# Patient Record
Sex: FEMALE | Race: WHITE | NOT HISPANIC OR LATINO | Employment: FULL TIME | URBAN - METROPOLITAN AREA
[De-identification: names, ages, dates, MRNs, and addresses within clinical notes are randomized per-mention and may not be internally consistent; named-entity substitution may affect disease eponyms.]

---

## 2023-06-04 ENCOUNTER — TRANSFERRED RECORDS (OUTPATIENT)
Dept: HEALTH INFORMATION MANAGEMENT | Facility: CLINIC | Age: 38
End: 2023-06-04

## 2024-05-30 ENCOUNTER — TRANSCRIBE ORDERS (OUTPATIENT)
Dept: OTHER | Age: 39
End: 2024-05-30

## 2024-05-30 DIAGNOSIS — R93.0 ABNORMAL MRI OF HEAD: Primary | ICD-10-CM

## 2024-05-31 ENCOUNTER — TELEPHONE (OUTPATIENT)
Dept: NEUROLOGY | Facility: CLINIC | Age: 39
End: 2024-05-31
Payer: COMMERCIAL

## 2024-05-31 NOTE — TELEPHONE ENCOUNTER
[facility] Call Center    Phone Message    May a detailed message be left on voicemail: yes     Reason for Call: Appointment Intake    Referring Provider Name: Dr. Mayra Bland affiliated with [facility]   Diagnosis and/or Symptoms: Abnormal MRI of head -2nd opinion MS    Per patient-she also has cervical dystonia. Dr. Bland recommended that she see a neuromuscular provider and not Dr. Dow.    Please review and contact the patient to discuss scheduling options.    Action Taken: Message routed to:  [facility] ([facility] Neurology    Travel Screening: Not Applicable     Date of Service:

## 2024-06-07 NOTE — TELEPHONE ENCOUNTER
Left Voicemail (1st Attempt) for the patient to call back and schedule the following:    Appointment type: New MS  Provider: Victor M Mars or Rubén  Return date: Next available  Specialty phone number: [phone]  Additional appointment(s) needed:   Additonal Notes: second opinion on MS diagnosis

## 2024-06-07 NOTE — TELEPHONE ENCOUNTER
Spoke with Clara. She would like to be seen in the MS clinic for a second opinion on whether she has MS or not. But she also reports that her current neurologist is recommending being seen by a neuromuscular specialist for neuropathy. EMG result available in media tab.    Clinic coordinators, please schedule pt in MS clinic.    Neuromuscular team - would this pt able to be seen in neuromuscular clinic for neuropathy?    Edda Russell RN

## 2024-06-11 NOTE — TELEPHONE ENCOUNTER
Left Voicemail (2nd Attempt) for the patient to call back and schedule the following:    Appointment type: New MS  Provider: Victor M Mars or Rubén  Return date: Next available  Specialty phone number: [phone]  Additional appointment(s) needed:   Additonal Notes: second opinion of Leilani Hidalgo on 6/11/2024 at 9:47 AM

## 2024-10-24 ENCOUNTER — OFFICE VISIT (OUTPATIENT)
Dept: NEUROLOGY | Facility: CLINIC | Age: 39
End: 2024-10-24
Attending: PSYCHIATRY & NEUROLOGY
Payer: COMMERCIAL

## 2024-10-24 VITALS — HEART RATE: 75 BPM | SYSTOLIC BLOOD PRESSURE: 113 MMHG | DIASTOLIC BLOOD PRESSURE: 69 MMHG

## 2024-10-24 DIAGNOSIS — M62.838 MUSCLE SPASM: Primary | ICD-10-CM

## 2024-10-24 DIAGNOSIS — M79.2 NEUROPATHIC PAIN: ICD-10-CM

## 2024-10-24 DIAGNOSIS — R83.8 ELEVATED CSF PROTEIN: ICD-10-CM

## 2024-10-24 PROCEDURE — 99205 OFFICE O/P NEW HI 60 MIN: CPT | Performed by: PSYCHIATRY & NEUROLOGY

## 2024-10-24 RX ORDER — NORETHINDRONE 5 MG/1
1 TABLET ORAL DAILY
COMMUNITY
Start: 2024-09-17

## 2024-10-24 RX ORDER — BACLOFEN 20 MG/1
20 TABLET ORAL 4 TIMES DAILY
COMMUNITY
Start: 2024-08-15

## 2024-10-24 RX ORDER — OXYCODONE AND ACETAMINOPHEN 5; 325 MG/1; MG/1
TABLET ORAL
COMMUNITY
Start: 2022-11-07

## 2024-10-24 RX ORDER — FROVATRIPTAN SUCCINATE 2.5 MG/1
2.5 TABLET, FILM COATED ORAL
COMMUNITY
Start: 2024-10-09

## 2024-10-24 RX ORDER — GABAPENTIN 800 MG/1
800 TABLET ORAL 3 TIMES DAILY
COMMUNITY
Start: 2024-05-29

## 2024-10-24 RX ORDER — ONDANSETRON 8 MG/1
8 TABLET, ORALLY DISINTEGRATING ORAL DAILY PRN
COMMUNITY
Start: 2023-11-10

## 2024-10-24 RX ORDER — PANTOPRAZOLE SODIUM 40 MG/1
TABLET, DELAYED RELEASE ORAL
COMMUNITY
Start: 2023-04-12

## 2024-10-24 RX ORDER — RIZATRIPTAN BENZOATE 10 MG/1
TABLET ORAL
COMMUNITY
Start: 2023-09-13

## 2024-10-24 RX ORDER — HYDROXYZINE HYDROCHLORIDE 50 MG/1
TABLET, FILM COATED ORAL
COMMUNITY
Start: 2023-01-11

## 2024-10-24 RX ORDER — FAMOTIDINE 40 MG/1
40 TABLET, FILM COATED ORAL DAILY
COMMUNITY
Start: 2023-11-10

## 2024-10-24 RX ORDER — SERTRALINE HYDROCHLORIDE 100 MG/1
2 TABLET, FILM COATED ORAL DAILY
COMMUNITY
Start: 2023-03-30

## 2024-10-24 RX ORDER — TOPIRAMATE 200 MG/1
100 TABLET, FILM COATED ORAL 2 TIMES DAILY
COMMUNITY
Start: 2023-08-16

## 2024-10-24 RX ORDER — BUSPIRONE HYDROCHLORIDE 30 MG/1
30 TABLET ORAL 2 TIMES DAILY
COMMUNITY
Start: 2023-01-11

## 2024-10-24 ASSESSMENT — PATIENT HEALTH QUESTIONNAIRE - PHQ9: SUM OF ALL RESPONSES TO PHQ QUESTIONS 1-9: 14

## 2024-10-24 NOTE — NURSING NOTE
Chief Complaint   Patient presents with    Results     Follow up Abnormal MRI brain        CLARI Ayala on 10/24/2024 at 10:14 AM

## 2024-10-24 NOTE — LETTER
10/24/2024      Clara Greenberg  [address] [address]      Dear Colleague,    Thank you for referring your patient, Clara Greenberg, to the [facility] NEUROLOGY [facility]. Please see a copy of my visit note below.    Date of Service: 10/24/2024    [facility] Neurology   MS Clinic Evaluation    Subjective: 39-year-old woman with a history of migraines and cervical dystonia who presents for evaluation of possible multiple sclerosis.    In 2021 she suffered rather sudden onset of electrical impulses into the fingertips.  This affected both hands in the middle 3 fingers.  It has decreased in intensity over the past 6 months.  However it is not clear to me if the improvement in symptoms corresponded with an increase in her gabapentin dosage.    She states that the electrical impulses are not provoked by neck movement.  They can happen when she is laying still.  She feels something start in her head, she describes this as a twitch like sensation, before the electrical impulses into the fingertips.    She struggles with muscle spasms.  These affect the shoulders, upper back and calves.  She is receiving Botox for cervical dystonia.  She feels weak.    She has had 2 back surgeries in the remote past.  This included an L4-5 discectomy.  Over this past weekend she struggled with severe right calf tightness that made it difficult for her to walk.    She struggles with constant spasms.  These have been present for years.  She is currently taking baclofen 20 mg 4 times per day and tizanidine 8 mg at bedtime to take the edge off.    She had 1 fall 6 weeks ago when she passed out.    She has also reported tingling in her lips and left arm.  Her legs can feel heavy.    She does have a history of seizure in June 2021.    She was diagnosed with multiple sclerosis by another provider.  She was placed on Vumerity for a couple years, then briumvi, but after receiving briumvi she felt that her symptoms got  "worse.    Allergies   Allergen Reactions     Contrast Dye      \"Just to CT and not MRI\" - Per pt     Sulfa Antibiotics        Current Outpatient Medications   Medication Sig Dispense Refill     baclofen (LIORESAL) 20 MG tablet Take 20 mg by mouth 4 times daily.       busPIRone HCl (BUSPAR) 30 MG tablet Take 30 mg by mouth 2 times daily.       cholecalciferol 50 MCG (2000 UT) tablet Take 2,000 Units by mouth daily.       famotidine (PEPCID) 40 MG tablet Take 40 mg by mouth daily.       frovatriptan (FROVA) 2.5 MG tablet Take 2.5 mg by mouth at onset of headache for migraine.       gabapentin (NEURONTIN) 800 MG tablet Take 800 mg by mouth 3 times daily.       hydrOXYzine HCl (ATARAX) 50 MG tablet TAKE ONE TO TWO TABLETS BY MOUTH THREE TIMES A DAY AT BEDTIME       norethindrone (AYGESTIN) 5 MG tablet Take 1 tablet by mouth daily.       ondansetron (ZOFRAN ODT) 8 MG ODT tab Take 8 mg by mouth daily as needed for nausea.       oxyCODONE-acetaminophen (PERCOCET) 5-325 MG tablet TAKE ONE TABLET BY MOUTH EVERY 6 HOURS AS NEEDED SEVERE PAIN MUST LAST 30 DAYS *CAUTION: OPIOID. RISK OF OVERDOSE AND ADDICTION.       pantoprazole (PROTONIX) 40 MG EC tablet TAKE ONE TABLET BY MOUTH TWICE A DAY 30-60 MINUTES BEFORE MEALS       rizatriptan (MAXALT) 10 MG tablet TAKE 1 TABLET AT ONSET OF MIGRAINE. MAY REPEAT IN 2 HOURS IF NEEDED UP TO 30 MG/ 24 HOURS AND MAX OF 5 DAYS/MONTH       sertraline (ZOLOFT) 100 MG tablet Take 2 tablets by mouth daily.       tiZANidine (ZANAFLEX) 4 MG tablet Take 2 tablets by mouth at bedtime.       topiramate (TOPAMAX) 200 MG tablet Take 100 mg by mouth 2 times daily.       ubrogepant (UBRELVY) 100 MG tablet TAKE ONE TABLET BY MOUTH AT ONSET OF HEADACHE - MAY REPEAT IN 2 HOURS AS NEEDED       No current facility-administered medications for this visit.        Past medical, surgical, social and family history was personally reviewed. Pertinent details noted above.     Physical Examination:   /69 (BP " Location: Right arm, Patient Position: Sitting, Cuff Size: Adult Regular)   Pulse 75     General: no acute distress  Cranial nerves:   VFFC  PERRL w/no RAPD  EOM full w/no CHRISTINA   Face symmetric  Hearing intact  No dysarthria   Motor:   Tone is normal   Bulk is normal     R L  Deltoid  5 5  Biceps  5 5  Triceps 5 5  Wrist ext 5 5  Finger ext 5 5  Finger abd 5 5    Hip flexion 5 5  Knee flexion 5 5  Knee ext 5 5  Ankle d/f 5 5    Reflexes: 2+ and symmetric throughout, babinski absent bilaterally  Sensory: vibration is severely reduced in the toes, mildly reduced in the ankles, JPS normal in the toes   Romberg is absent  Coordination: no ataxia or dysmetria  Gait: normal base and stride, tandem gait is intact w effort    Tests/Imaging:   CSF traumatic tap w 130k rbc, elevated protein and igg index     Serum encephalopathy panel (including gad65) and lupus screening were negative     EMG 5/2024 -    Impression: This is abnormal study due to the findings of bilateral  chronic left L4-5 and right L5-S1 lumbar radiculopathies. However, given  reduced bilaterally sural amplitudes this is indicative of superimposed  mild early sensory polyneuropathy if clinically suspected.  There were no electrophysiologic findings of active bilateral lumbo-sacral  radiculopathies or plexopathies.         MRI Brain  6/2023 - area of blush enhancement in left corona radiata (chronic and stable between multiple studies), ?faint spots in corpus callosum    MRI Cervical spine   6/2023 - mild deg changes, no spinal cord lesions    MRI Thoracic spine   6/2023 - no lesions noted     Assessment: 39-year-old woman who has predominantly struggled with muscle spasms, but has also experienced symptoms of neuropathic pain.  CSF was potentially positive for an elevated IgG index and elevated protein level, though this is with the caveat that this study was clearly traumatic.    If the IgG index is truly elevated this would raise suspicion for an immune  mediated disorder.  Stiff person syndrome can produce muscle cramps and is expected to cause CSF inflammation.  Alternatively, her MRI brain was remarkable for some faint white spots in the corpus callosum.  This may be suggestive of Susac syndrome.  Additional testing is required.    Appearance of MRI is not suggestive of multiple sclerosis, nor are her symptoms.  She is not advised to trial additional disease modifying therapies for multiple sclerosis.  We discussed how the area of enhancement is most consistent with a venous anomaly.    Plan:   -Repeat CSF testing  - Follow-up to discuss results, consider MRI brain 7 Maddie    Note was completed with the assistance of Dragon Fluency software which can often result in accidental word substitutions.     A total of 60 minutes on the date of service were spent in the care of this patient.   Lara Dow MD on 10/24/2024 at 10:17 AM        Again, thank you for allowing me to participate in the care of your patient.        Sincerely,        Lara Dow MD

## 2024-10-24 NOTE — PATIENT INSTRUCTIONS
When your spinal fluid was tested there was an elevation of an immune protein   However, the tap was traumatic, so this elevation might not be real     I recommend repeating the spinal tap to determine if the elevation in protein is real, because this would impact my recommendations for you     Follow up to discuss results

## 2024-10-24 NOTE — PROGRESS NOTES
"Date of Service: 10/24/2024    [facility] Neurology   MS Clinic Evaluation    Subjective: 39-year-old woman with a history of migraines and cervical dystonia who presents for evaluation of possible multiple sclerosis.    In 2021 she suffered rather sudden onset of electrical impulses into the fingertips.  This affected both hands in the middle 3 fingers.  It has decreased in intensity over the past 6 months.  However it is not clear to me if the improvement in symptoms corresponded with an increase in her gabapentin dosage.    She states that the electrical impulses are not provoked by neck movement.  They can happen when she is laying still.  She feels something start in her head, she describes this as a twitch like sensation, before the electrical impulses into the fingertips.    She struggles with muscle spasms.  These affect the shoulders, upper back and calves.  She is receiving Botox for cervical dystonia.  She feels weak.    She has had 2 back surgeries in the remote past.  This included an L4-5 discectomy.  Over this past weekend she struggled with severe right calf tightness that made it difficult for her to walk.    She struggles with constant spasms.  These have been present for years.  She is currently taking baclofen 20 mg 4 times per day and tizanidine 8 mg at bedtime to take the edge off.    She had 1 fall 6 weeks ago when she passed out.    She has also reported tingling in her lips and left arm.  Her legs can feel heavy.    She does have a history of seizure in June 2021.    She was diagnosed with multiple sclerosis by another provider.  She was placed on Vumerity for a couple years, then briumvi, but after receiving briumvi she felt that her symptoms got worse.    Allergies   Allergen Reactions    Contrast Dye      \"Just to CT and not MRI\" - Per pt    Sulfa Antibiotics        Current Outpatient Medications   Medication Sig Dispense Refill    baclofen (LIORESAL) 20 MG tablet Take 20 mg by mouth 4 " times daily.      busPIRone HCl (BUSPAR) 30 MG tablet Take 30 mg by mouth 2 times daily.      cholecalciferol 50 MCG (2000 UT) tablet Take 2,000 Units by mouth daily.      famotidine (PEPCID) 40 MG tablet Take 40 mg by mouth daily.      frovatriptan (FROVA) 2.5 MG tablet Take 2.5 mg by mouth at onset of headache for migraine.      gabapentin (NEURONTIN) 800 MG tablet Take 800 mg by mouth 3 times daily.      hydrOXYzine HCl (ATARAX) 50 MG tablet TAKE ONE TO TWO TABLETS BY MOUTH THREE TIMES A DAY AT BEDTIME      norethindrone (AYGESTIN) 5 MG tablet Take 1 tablet by mouth daily.      ondansetron (ZOFRAN ODT) 8 MG ODT tab Take 8 mg by mouth daily as needed for nausea.      oxyCODONE-acetaminophen (PERCOCET) 5-325 MG tablet TAKE ONE TABLET BY MOUTH EVERY 6 HOURS AS NEEDED SEVERE PAIN MUST LAST 30 DAYS *CAUTION: OPIOID. RISK OF OVERDOSE AND ADDICTION.      pantoprazole (PROTONIX) 40 MG EC tablet TAKE ONE TABLET BY MOUTH TWICE A DAY 30-60 MINUTES BEFORE MEALS      rizatriptan (MAXALT) 10 MG tablet TAKE 1 TABLET AT ONSET OF MIGRAINE. MAY REPEAT IN 2 HOURS IF NEEDED UP TO 30 MG/ 24 HOURS AND MAX OF 5 DAYS/MONTH      sertraline (ZOLOFT) 100 MG tablet Take 2 tablets by mouth daily.      tiZANidine (ZANAFLEX) 4 MG tablet Take 2 tablets by mouth at bedtime.      topiramate (TOPAMAX) 200 MG tablet Take 100 mg by mouth 2 times daily.      ubrogepant (UBRELVY) 100 MG tablet TAKE ONE TABLET BY MOUTH AT ONSET OF HEADACHE - MAY REPEAT IN 2 HOURS AS NEEDED       No current facility-administered medications for this visit.        Past medical, surgical, social and family history was personally reviewed. Pertinent details noted above.     Physical Examination:   /69 (BP Location: Right arm, Patient Position: Sitting, Cuff Size: Adult Regular)   Pulse 75     General: no acute distress  Cranial nerves:   VFFC  PERRL w/no RAPD  EOM full w/no CHRISTINA   Face symmetric  Hearing intact  No dysarthria   Motor:   Tone is normal   Bulk is  normal     R L  Deltoid  5 5  Biceps  5 5  Triceps 5 5  Wrist ext 5 5  Finger ext 5 5  Finger abd 5 5    Hip flexion 5 5  Knee flexion 5 5  Knee ext 5 5  Ankle d/f 5 5    Reflexes: 2+ and symmetric throughout, babinski absent bilaterally  Sensory: vibration is severely reduced in the toes, mildly reduced in the ankles, JPS normal in the toes   Romberg is absent  Coordination: no ataxia or dysmetria  Gait: normal base and stride, tandem gait is intact w effort    Tests/Imaging:   CSF traumatic tap w 130k rbc, elevated protein and igg index     Serum encephalopathy panel (including gad65) and lupus screening were negative     EMG 5/2024 -    Impression: This is abnormal study due to the findings of bilateral  chronic left L4-5 and right L5-S1 lumbar radiculopathies. However, given  reduced bilaterally sural amplitudes this is indicative of superimposed  mild early sensory polyneuropathy if clinically suspected.  There were no electrophysiologic findings of active bilateral lumbo-sacral  radiculopathies or plexopathies.         MRI Brain  6/2023 - area of blush enhancement in left corona radiata (chronic and stable between multiple studies), ?faint spots in corpus callosum    MRI Cervical spine   6/2023 - mild deg changes, no spinal cord lesions    MRI Thoracic spine   6/2023 - no lesions noted     Assessment: 39-year-old woman who has predominantly struggled with muscle spasms, but has also experienced symptoms of neuropathic pain.  CSF was potentially positive for an elevated IgG index and elevated protein level, though this is with the caveat that this study was clearly traumatic.    If the IgG index is truly elevated this would raise suspicion for an immune mediated disorder.  Stiff person syndrome can produce muscle cramps and is expected to cause CSF inflammation.  Alternatively, her MRI brain was remarkable for some faint white spots in the corpus callosum.  This may be suggestive of Susac syndrome.  Additional  testing is required.    Appearance of MRI is not suggestive of multiple sclerosis, nor are her symptoms.  She is not advised to trial additional disease modifying therapies for multiple sclerosis.  We discussed how the area of enhancement is most consistent with a venous anomaly.    Plan:   -Repeat CSF testing  - Follow-up to discuss results, consider MRI brain 7 Maddie    Note was completed with the assistance of Dragon Fluency software which can often result in accidental word substitutions.     A total of 60 minutes on the date of service were spent in the care of this patient.   Lara Dow MD on 10/24/2024 at 10:17 AM

## 2024-11-07 ENCOUNTER — TELEPHONE (OUTPATIENT)
Dept: EMERGENCY MEDICINE | Facility: CLINIC | Age: 39
End: 2024-11-07
Payer: COMMERCIAL

## 2024-11-07 NOTE — TELEPHONE ENCOUNTER
Left voicemail for patient regarding upcoming lumbar puncture on 11/13/24. Patient given number to call back. No medication to be held. Pt will need  and monitoring x 60 minutes post procedure.

## 2024-11-10 ENCOUNTER — HEALTH MAINTENANCE LETTER (OUTPATIENT)
Age: 39
End: 2024-11-10

## 2025-01-04 ENCOUNTER — HEALTH MAINTENANCE LETTER (OUTPATIENT)
Age: 40
End: 2025-01-04

## 2025-03-06 ENCOUNTER — LAB (OUTPATIENT)
Dept: LAB | Facility: HOSPITAL | Age: 40
End: 2025-03-06
Attending: PSYCHIATRY & NEUROLOGY
Payer: COMMERCIAL

## 2025-03-06 ENCOUNTER — HOSPITAL ENCOUNTER (OUTPATIENT)
Dept: RADIOLOGY | Facility: HOSPITAL | Age: 40
End: 2025-03-06
Attending: PSYCHIATRY & NEUROLOGY
Payer: COMMERCIAL

## 2025-03-06 VITALS
DIASTOLIC BLOOD PRESSURE: 67 MMHG | SYSTOLIC BLOOD PRESSURE: 114 MMHG | HEART RATE: 62 BPM | OXYGEN SATURATION: 100 % | RESPIRATION RATE: 16 BRPM

## 2025-03-06 DIAGNOSIS — M62.838 MUSCLE SPASM: ICD-10-CM

## 2025-03-06 DIAGNOSIS — R83.8 ELEVATED CSF PROTEIN: ICD-10-CM

## 2025-03-06 LAB
APPEARANCE CSF: CLEAR
COLOR CSF: COLORLESS
GLUCOSE CSF-MCNC: 66 MG/DL (ref 40–70)
PROT CSF-MCNC: 30.1 MG/DL (ref 15–45)
RBC # CSF MANUAL: 5 /UL (ref 0–2)
TUBE # CSF: 4
WBC # CSF MANUAL: 0 /UL (ref 0–5)

## 2025-03-06 PROCEDURE — 89050 BODY FLUID CELL COUNT: CPT

## 2025-03-06 PROCEDURE — 86255 FLUORESCENT ANTIBODY SCREEN: CPT

## 2025-03-06 PROCEDURE — 86341 ISLET CELL ANTIBODY: CPT

## 2025-03-06 PROCEDURE — 82784 ASSAY IGA/IGD/IGG/IGM EACH: CPT

## 2025-03-06 PROCEDURE — 36415 COLL VENOUS BLD VENIPUNCTURE: CPT

## 2025-03-06 PROCEDURE — 82945 GLUCOSE OTHER FLUID: CPT

## 2025-03-06 PROCEDURE — 84157 ASSAY OF PROTEIN OTHER: CPT

## 2025-03-06 PROCEDURE — 62328 DX LMBR SPI PNXR W/FLUOR/CT: CPT

## 2025-03-06 RX ORDER — IBUPROFEN 600 MG/1
600 TABLET, FILM COATED ORAL ONCE
Status: DISPENSED | OUTPATIENT
Start: 2025-03-06

## 2025-03-06 RX ORDER — ACETAMINOPHEN 325 MG/1
650 TABLET ORAL EVERY 4 HOURS PRN
OUTPATIENT
Start: 2025-03-06

## 2025-03-06 NOTE — PRE-PROCEDURE
GENERAL PRE-PROCEDURE:   Procedure:  Lumbar puncture  Date/Time:  3/6/2025 10:51 AM    Written consent obtained?: Yes    Risks and benefits: Risks, benefits and alternatives were discussed    Consent given by:  Patient  Patient states understanding of procedure being performed: Yes    Patient's understanding of procedure matches consent: Yes    Procedure consent matches procedure scheduled: Yes    Expected level of sedation:  Moderate  Appropriately NPO:  Yes  ASA Class:  1  Mallampati  :  Grade 1- soft palate, uvula, tonsillar pillars, and posterior pharyngeal wall visible  Lungs:  Lungs clear with good breath sounds bilaterally  Heart:  Normal heart sounds and rate  History & Physical reviewed:  History and physical reviewed and no updates needed  Statement of review:  I have reviewed the lab findings, diagnostic data, medications, and the plan for sedation

## 2025-03-06 NOTE — PROCEDURES
[facility] [place]    Procedure: Imaging Procedure Note    Date/Time: 3/6/2025 10:52 AM    Performed by: Barrett Lee MD  Authorized by: Barrett Lee MD  IR Fellow Physician:    Pre Procedure Diagnosis: polymyopathy  Post Procedure Diagnosis: same    UNIVERSAL PROTOCOL   Site Marked: Yes  Prior Images Obtained and Reviewed:  Yes  Required items: Required blood products, implants, devices and special equipment available    Patient identity confirmed:  Verbally with patient  Patient was reevaluated immediately before administering moderate or deep sedation or anesthesia  Confirmation Checklist:  Patient's identity using two indicators  Time out: Immediately prior to the procedure a time out was called    Universal Protocol: the Joint Commission Universal Protocol was followed    Preparation: Patient was prepped and draped in usual sterile fashion      SEDATION    Patient Sedated: No    Findings: none    Specimens: fluid and/or tissue for laboratory analysis    Procedural Complications: None    Condition: Stable    Plan: To recovery      PROCEDURE  Describe Procedure: Lumbar puncture   8cc clear csf  Patient Tolerance:  Patient tolerated the procedure well with no immediate complications  Length of time physician/provider present for 1:1 monitoring during sedation:  0 min

## 2025-03-09 LAB
ALB CSF/SERPL: 5.2 RATIO
ALBUMIN CSF-MCNC: 22 MG/DL
ALBUMIN SERPL-MCNC: 4263 MG/DL
IGG CSF-MCNC: 2.3 MG/DL
IGG SERPL-MCNC: 767 MG/DL
IGG SYNTH RATE SER+CSF CALC-MRATE: <0 MG/D
IGG/ALB CLEAR SER+CSF-RTO: 0.58 RATIO
IGG/ALB CSF: 0.1 RATIO
OLIGOCLONAL BANDS CSF ELPH-IMP: ABNORMAL
OLIGOCLONAL BANDS CSF ELPH-IMP: NEGATIVE
OLIGOCLONAL BANDS CSF IEF: 0 BANDS

## 2025-03-13 LAB
AMPAR2 IGG CSF QL CBA IFA: NEGATIVE
AMPHIPHYSIN AB CSF QL IF: NEGATIVE
ANNOTATION COMMENT IMP: NORMAL
CASPR2 IGG CSF QL CBA IFA: NEGATIVE
CV2 AB CSF QL IF: NEGATIVE
DPPX IGG CSF QL CBA IFA: NEGATIVE
GABABR IGG CSF QL CBA IFA: NEGATIVE
GAD65 IGG+IGM CSF IA-SCNC: 0 NMOL/L
GFAP ALPHA IGG CSF QL IF: NEGATIVE
GLIAL NUC TYPE 1 AB CSF QL IF: NEGATIVE
HU1 AB CSF QL IF: NEGATIVE
HU2 AB CSF QL IF: NEGATIVE
HU3 AB CSF QL IF: NEGATIVE
IGLON5 IGG CSF QL CBA IFA: NEGATIVE
IMMUNOLOGIST REVIEW: NORMAL
LGI1 IGG CSF QL CBA IFA: NEGATIVE
MGLUR1 IGG CSF QL IF: NEGATIVE
NEUROCHONDRIN AB CSF QL IF: NEGATIVE
NIF IGG CSF QL IF: NEGATIVE
NMDAR1 IGG CSF QL CBA IFA: NEGATIVE
PCA-1 AB CSF QL IF: NEGATIVE
PCA-2 AB CSF QL IF: NEGATIVE
PCA-TR AB CSF QL IF: NEGATIVE
PDE10A AB SPEC QL IF: NEGATIVE
SEPTIN-7 IGG CSF QL IF: NEGATIVE
TRIM46 SPEC QL IF: NEGATIVE

## 2025-06-02 ENCOUNTER — TELEPHONE (OUTPATIENT)
Dept: NEUROLOGY | Facility: CLINIC | Age: 40
End: 2025-06-02
Payer: COMMERCIAL

## 2025-06-02 NOTE — TELEPHONE ENCOUNTER
Attempted to reach patient to remind them about appointment scheduled with Osvaldo Coker MD on 6/3/25 in our [place] clinic.    A voicemail was left with a call back number if the patient has questions or would like to reschedule.

## 2025-06-30 ENCOUNTER — TELEPHONE (OUTPATIENT)
Dept: NEUROLOGY | Facility: CLINIC | Age: 40
End: 2025-06-30
Payer: COMMERCIAL

## 2025-06-30 NOTE — TELEPHONE ENCOUNTER
Attempted to reach patient to remind them about appointment scheduled with Osvaldo Coker MD on 7/1/25 in our [place] clinic.    A voicemail was left with a call back number if the patient has questions or would like to reschedule.

## 2025-07-01 ENCOUNTER — OFFICE VISIT (OUTPATIENT)
Dept: NEUROLOGY | Facility: CLINIC | Age: 40
End: 2025-07-01
Payer: COMMERCIAL

## 2025-07-01 DIAGNOSIS — M54.17 RIGHT LUMBOSACRAL RADICULOPATHY: Primary | ICD-10-CM

## 2025-07-01 DIAGNOSIS — M62.838 MUSCLE SPASM: ICD-10-CM

## 2025-07-01 PROCEDURE — 95870 NDL EMG LMTD STD MUSC 1 XTR: CPT | Performed by: PSYCHIATRY & NEUROLOGY

## 2025-07-01 NOTE — PROGRESS NOTES
[facility]  Electrodiagnostic Laboratory                 Department of Neurology                                                                                                         Test Date:  2025    Patient: Clara Greenberg : 1985 Physician: Osvaldo Coker MD   Sex: Female AGE: 40 year Ref Phys: Lara Dow MD   ID#: [id number]   Technician:      History and Examination:    40-year-old woman with history of muscle spasms in both legs, trunk, and cervical region, for many years.  The right leg is more affected and the gastrocnemius muscle spasms more than others.  She had a previous EMG study in May 2024 at [facility], [facility] showing bilateral lumbosacral radiculopathies at L5-S1.  Repeat EMG study was requested specifically to assess for continued motor unit activity that would suggest stiff person syndrome.  The patient was on baclofen and tizanidine but has stopped those medications several days ago.  Interestingly, she does not report worsening of her muscle spasms after stopping the baclofen.    Techniques:    EMG was done with a concentric needle electrode.     Results    Needle EMG of the right TA and vastus lateralis showed no abnormal spontaneous activity, and mildly reduced recruitment of a few large, long-duration, and occasionally polyphasic at the TA, MUPs.  Needle EMG of the right medial gastrocnemius showed questionable 1+ fibs/positive waves, but normal MUP morphology and recruitment.  EMG of the right short head of the biceps femoris was normal.  Needle EMG of the right lower thoracic, mid thoracic paraspinals, and lumbar paraspinals approximately at L4 showed 1+ to 2+ positive waves in all levels.  There was no evidence of continuous motor unit activity in any paraspinal or right lower extremity muscle.      Interpretation:    Abnormal study.  There is electrodiagnostic evidence of 1) Chronic inactive right L4/L5  radiculopathies. There may also be a mild active right S1 radiculopathy based on the findings. 2) Abnormal spontaneous activity at several right sided thoracic and lumbar paraspinal levels, not limited to the level of the patient's prior surgery.  This finding may be explained by right sided thoracic and lumbar radiculopathies, versus myopathy.  Recommend correlation with MRI findings. There was NO evidence of continuous motor unit activity in any muscle, to suggest stiff person syndrome, and no evidence of myopathy at the right lower limb.         ___________________________  Osvaldo Coker MD          Electromyography     Side Muscle Ins Act Fibs/PSW Fasc HF Amp Dur Poly Recrt Int Pat   Right Tib ant Nml None Nml 0 1+ 1+ 1+ Carlyn Nml   Right Gastroc MH Nml 1+ Nml 0 Nml Nml 0 Nml Nml   Right Vastus lat Nml None Nml 0 1+ 1+ 0 Carlyn Nml   Right Biceps Fem SH Nml None Nml 0 Nml Nml 0 Nml Nml   Right Thoracic PSP lower Nml 1+ Nml 0        Right Thoracic PSP mid Nml 2+ Nml 0        Right Lumbar PSP upper Nml 2+ Nml 0              Waveforms / Images:

## 2025-07-01 NOTE — LETTER
2025      Clara Greenberg  [address] [facility] [address] [address]      Dear Colleague,    Thank you for referring your patient, Clara Greenberg, to the [facility] NEUROLOGY [facility]. Please see a copy of my visit note below.                        [facility]  Electrodiagnostic Laboratory                 Department of Neurology                                                                                                         Test Date:  2025    Patient: Clara Greenberg : 1985 Physician: Osvaldo Coker MD   Sex: Female AGE: 40 year Ref Phys: Lara Dow MD   ID#: [id number]   Technician:      History and Examination:    40-year-old woman with history of muscle spasms in both legs, trunk, and cervical region, for many years.  The right leg is more affected and the gastrocnemius muscle spasms more than others.  She had a previous EMG study in May 2024 at [facility] showing bilateral lumbosacral radiculopathies at L5-S1.  Repeat EMG study was requested specifically to assess for continued motor unit activity that would suggest stiff person syndrome.  The patient was on baclofen and tizanidine but has stopped those medications several days ago.  Interestingly, she does not report worsening of her muscle spasms after stopping the baclofen.    Techniques:    EMG was done with a concentric needle electrode.     Results    Needle EMG of the right TA and vastus lateralis showed no abnormal spontaneous activity, and mildly reduced recruitment of a few large, long-duration, and occasionally polyphasic at the TA, MUPs.  Needle EMG of the right medial gastrocnemius showed questionable 1+ fibs/positive waves, but normal MUP morphology and recruitment.  EMG of the right short head of the biceps femoris was normal.  Needle EMG of the right lower thoracic, mid thoracic paraspinals, and lumbar paraspinals approximately at L4 showed 1+ to 2+ positive waves in  all levels.  There was no evidence of continuous motor unit activity in any paraspinal or right lower extremity muscle.      Interpretation:    Abnormal study.  There is electrodiagnostic evidence of 1) Chronic inactive right L4/L5 radiculopathies. There may also be a mild active right S1 radiculopathy based on the findings. 2) Abnormal spontaneous activity at several right sided thoracic and lumbar paraspinal levels, not limited to the level of the patient's prior surgery.  This finding may be explained by right sided thoracic and lumbar radiculopathies, versus myopathy.  Recommend correlation with MRI findings. There was NO evidence of continuous motor unit activity in any muscle, to suggest stiff person syndrome, and no evidence of myopathy at the right lower limb.         ___________________________  Osvaldo Coker MD          Electromyography     Side Muscle Ins Act Fibs/PSW Fasc HF Amp Dur Poly Recrt Int Pat   Right Tib ant Nml None Nml 0 1+ 1+ 1+ Carlyn Nml   Right Gastroc MH Nml 1+ Nml 0 Nml Nml 0 Nml Nml   Right Vastus lat Nml None Nml 0 1+ 1+ 0 Carlyn Nml   Right Biceps Fem SH Nml None Nml 0 Nml Nml 0 Nml Nml   Right Thoracic PSP lower Nml 1+ Nml 0        Right Thoracic PSP mid Nml 2+ Nml 0        Right Lumbar PSP upper Nml 2+ Nml 0              Waveforms / Images:          Again, thank you for allowing me to participate in the care of your patient.        Sincerely,        Osvaldo Coker MD    Electronically signed

## 2025-07-11 PROCEDURE — 86235 NUCLEAR ANTIGEN ANTIBODY: CPT | Performed by: PSYCHIATRY & NEUROLOGY

## 2025-07-11 PROCEDURE — 86225 DNA ANTIBODY NATIVE: CPT | Performed by: PSYCHIATRY & NEUROLOGY

## 2025-07-11 PROCEDURE — 86038 ANTINUCLEAR ANTIBODIES: CPT | Performed by: PSYCHIATRY & NEUROLOGY

## 2025-07-16 ENCOUNTER — RESULTS FOLLOW-UP (OUTPATIENT)
Dept: NEUROLOGY | Facility: CLINIC | Age: 40
End: 2025-07-16
Payer: COMMERCIAL

## 2025-07-16 DIAGNOSIS — M79.10 MYALGIA: Primary | ICD-10-CM

## 2025-07-16 DIAGNOSIS — R74.8 ELEVATED CK: ICD-10-CM

## 2025-07-17 ENCOUNTER — PATIENT OUTREACH (OUTPATIENT)
Dept: CARE COORDINATION | Facility: CLINIC | Age: 40
End: 2025-07-17
Payer: COMMERCIAL

## 2025-07-21 ENCOUNTER — PATIENT OUTREACH (OUTPATIENT)
Dept: CARE COORDINATION | Facility: CLINIC | Age: 40
End: 2025-07-21
Payer: COMMERCIAL